# Patient Record
Sex: MALE | Race: WHITE | NOT HISPANIC OR LATINO
[De-identification: names, ages, dates, MRNs, and addresses within clinical notes are randomized per-mention and may not be internally consistent; named-entity substitution may affect disease eponyms.]

---

## 2024-10-28 ENCOUNTER — P2P PATIENT RECORD (OUTPATIENT)
Age: 79
End: 2024-10-28

## 2024-11-04 ENCOUNTER — OFFICE VISIT (OUTPATIENT)
Dept: URBAN - METROPOLITAN AREA CLINIC 2 | Facility: CLINIC | Age: 79
End: 2024-11-04
Payer: MEDICARE

## 2024-11-04 ENCOUNTER — DASHBOARD ENCOUNTERS (OUTPATIENT)
Age: 79
End: 2024-11-04

## 2024-11-04 VITALS
DIASTOLIC BLOOD PRESSURE: 77 MMHG | HEIGHT: 67 IN | WEIGHT: 197.4 LBS | SYSTOLIC BLOOD PRESSURE: 116 MMHG | TEMPERATURE: 98.2 F | HEART RATE: 65 BPM | BODY MASS INDEX: 30.98 KG/M2

## 2024-11-04 DIAGNOSIS — Z86.0101 HISTORY OF ADENOMATOUS POLYP OF COLON: ICD-10-CM

## 2024-11-04 DIAGNOSIS — K58.2 IRRITABLE BOWEL SYNDROME WITH BOTH CONSTIPATION AND DIARRHEA: ICD-10-CM

## 2024-11-04 PROBLEM — 10743008: Status: ACTIVE | Noted: 2024-11-04

## 2024-11-04 PROCEDURE — 99243 OFF/OP CNSLTJ NEW/EST LOW 30: CPT | Performed by: NURSE PRACTITIONER

## 2024-11-04 RX ORDER — CARVEDILOL 12.5 MG/1
TABLET, FILM COATED ORAL
Qty: 180 TABLET | Status: ACTIVE | COMMUNITY

## 2024-11-04 RX ORDER — BRIMONIDINE TARTRATE 2 MG/ML
SOLUTION/ DROPS OPHTHALMIC
Qty: 15 MILLILITER | Status: ACTIVE | COMMUNITY

## 2024-11-04 RX ORDER — UMECLIDINIUM BROMIDE AND VILANTEROL TRIFENATATE 62.5; 25 UG/1; UG/1
POWDER RESPIRATORY (INHALATION)
Qty: 60 EACH | Status: ACTIVE | COMMUNITY

## 2024-11-04 RX ORDER — DORZOLAMIDE HYDROCHLORIDE AND TIMOLOL MALEATE 20; 5 MG/ML; MG/ML
SOLUTION OPHTHALMIC
Qty: 30 MILLILITER | Status: ACTIVE | COMMUNITY

## 2024-11-04 RX ORDER — PANTOPRAZOLE 40 MG/1
TABLET, DELAYED RELEASE ORAL
Qty: 90 TABLET | Status: ACTIVE | COMMUNITY

## 2024-11-04 RX ORDER — LATANOPROSTENE BUNOD 0.24 MG/ML
INSTILL 1 DROP INTO BOTH EYES EVERY NIGHT AT BEDTIME SOLUTION/ DROPS OPHTHALMIC
Qty: 7.5 MILLILITER | Refills: 0 | Status: ACTIVE | COMMUNITY

## 2024-11-04 RX ORDER — TRAZODONE HYDROCHLORIDE 100 MG/1
TABLET ORAL
Qty: 90 TABLET | Status: ACTIVE | COMMUNITY

## 2024-11-04 NOTE — HPI-TODAY'S VISIT:
Very pleasant 79-year-old male seen on referral from Dr Emerson Wray today for complaints of upset stomach and black stools.  Patient will have 3 to 4 days of constipation and then will have loose stools.  On those days this bowel movements can be urgent.  He denies overt GI bleeding.  He has taken Pepto-Bismol to help with the frequency.  His weight is stable he is trying to lose weight.  He was recently started on pantoprazole.  His last colonoscopy was 2018 and was notable for an adenomatous colon polyp.  Patient takes Eliquis for history of pulmonary emboli. A copy of this will be sent to the referring provider.

## 2025-02-20 ENCOUNTER — LAB OUTSIDE AN ENCOUNTER (OUTPATIENT)
Dept: URBAN - METROPOLITAN AREA CLINIC 2 | Facility: CLINIC | Age: 80
End: 2025-02-20

## 2025-02-20 ENCOUNTER — OFFICE VISIT (OUTPATIENT)
Dept: URBAN - METROPOLITAN AREA CLINIC 2 | Facility: CLINIC | Age: 80
End: 2025-02-20
Payer: COMMERCIAL

## 2025-02-20 VITALS
BODY MASS INDEX: 30.64 KG/M2 | DIASTOLIC BLOOD PRESSURE: 83 MMHG | WEIGHT: 195.2 LBS | HEART RATE: 68 BPM | SYSTOLIC BLOOD PRESSURE: 129 MMHG | TEMPERATURE: 97.8 F | HEIGHT: 67 IN

## 2025-02-20 DIAGNOSIS — R19.7 DIARRHEA, UNSPECIFIED TYPE: ICD-10-CM

## 2025-02-20 DIAGNOSIS — Z86.0101 HISTORY OF ADENOMATOUS POLYP OF COLON: ICD-10-CM

## 2025-02-20 PROCEDURE — 99213 OFFICE O/P EST LOW 20 MIN: CPT | Performed by: NURSE PRACTITIONER

## 2025-02-20 RX ORDER — TRAZODONE HYDROCHLORIDE 100 MG/1
TABLET ORAL
Qty: 90 TABLET | Status: ACTIVE | COMMUNITY

## 2025-02-20 RX ORDER — DORZOLAMIDE HYDROCHLORIDE AND TIMOLOL MALEATE 20; 5 MG/ML; MG/ML
SOLUTION OPHTHALMIC
Qty: 30 MILLILITER | Status: ACTIVE | COMMUNITY

## 2025-02-20 RX ORDER — PANTOPRAZOLE 40 MG/1
TABLET, DELAYED RELEASE ORAL
Qty: 90 TABLET | Status: ACTIVE | COMMUNITY

## 2025-02-20 RX ORDER — LATANOPROSTENE BUNOD 0.24 MG/ML
INSTILL 1 DROP INTO BOTH EYES EVERY NIGHT AT BEDTIME SOLUTION/ DROPS OPHTHALMIC
Qty: 7.5 MILLILITER | Refills: 0 | Status: ACTIVE | COMMUNITY

## 2025-02-20 RX ORDER — CARVEDILOL 12.5 MG/1
TABLET, FILM COATED ORAL
Qty: 180 TABLET | Status: ACTIVE | COMMUNITY

## 2025-02-20 RX ORDER — BRIMONIDINE TARTRATE 2 MG/ML
SOLUTION/ DROPS OPHTHALMIC
Qty: 15 MILLILITER | Status: ACTIVE | COMMUNITY

## 2025-02-20 RX ORDER — UMECLIDINIUM BROMIDE AND VILANTEROL TRIFENATATE 62.5; 25 UG/1; UG/1
POWDER RESPIRATORY (INHALATION)
Qty: 60 EACH | Status: ACTIVE | COMMUNITY

## 2025-02-20 NOTE — HPI-TODAY'S VISIT:
Very pleasant 80-year-old male seen today for complaints of diarrhea.  He feels constipated in the afternoons but in the mornings can have urgent diarrhea.  He does drink 3 cups of coffee per day no cream.  His weight is stable and his appetite is good.  He had a normal ultrasound of his gallbladder but did have enlarged liver.  Patient is due for colon cancer screening due to history of colon polyps.  Patient also takes Eliquis for history of pulmonary emboli.  His primary care refills medication he does not follow with pulmonary.  Patient does have cardiac history and regularly follows with Dr. Cordero.

## 2025-02-26 ENCOUNTER — TELEPHONE ENCOUNTER (OUTPATIENT)
Dept: URBAN - METROPOLITAN AREA CLINIC 2 | Facility: CLINIC | Age: 80
End: 2025-02-26

## 2025-02-27 ENCOUNTER — TELEPHONE ENCOUNTER (OUTPATIENT)
Dept: URBAN - METROPOLITAN AREA CLINIC 80 | Facility: CLINIC | Age: 80
End: 2025-02-27

## 2025-03-06 ENCOUNTER — OFFICE VISIT (OUTPATIENT)
Dept: URBAN - METROPOLITAN AREA CLINIC 128 | Facility: CLINIC | Age: 80
End: 2025-03-06

## 2025-03-07 ENCOUNTER — OFFICE VISIT (OUTPATIENT)
Dept: URBAN - METROPOLITAN AREA CLINIC 128 | Facility: CLINIC | Age: 80
End: 2025-03-07

## 2025-03-13 ENCOUNTER — CLAIMS CREATED FROM THE CLAIM WINDOW (OUTPATIENT)
Dept: URBAN - METROPOLITAN AREA SURGERY CENTER 31 | Facility: SURGERY CENTER | Age: 80
End: 2025-03-13
Payer: COMMERCIAL

## 2025-03-13 DIAGNOSIS — R19.7 ACUTE DIARRHEA: ICD-10-CM

## 2025-03-13 DIAGNOSIS — Z86.0100 HISTORY OF COLON POLYPS: ICD-10-CM

## 2025-03-13 DIAGNOSIS — Z86.0100 PERSONAL HISTORY OF COLON POLYPS, UNSPECIFIED: ICD-10-CM

## 2025-03-13 DIAGNOSIS — Z12.11 ENCOUNTER FOR SCREENING FOR MALIGNANT NEOPLASM OF COLON: ICD-10-CM

## 2025-03-13 PROCEDURE — 45380 COLONOSCOPY AND BIOPSY: CPT | Performed by: INTERNAL MEDICINE

## 2025-03-13 PROCEDURE — 00812 ANES LWR INTST SCR COLSC: CPT | Performed by: NURSE ANESTHETIST, CERTIFIED REGISTERED

## 2025-03-13 RX ORDER — TRAZODONE HYDROCHLORIDE 100 MG/1
TABLET ORAL
Qty: 90 TABLET | Status: ACTIVE | COMMUNITY

## 2025-03-13 RX ORDER — BRIMONIDINE TARTRATE 2 MG/ML
SOLUTION/ DROPS OPHTHALMIC
Qty: 15 MILLILITER | Status: ACTIVE | COMMUNITY

## 2025-03-13 RX ORDER — CARVEDILOL 12.5 MG/1
TABLET, FILM COATED ORAL
Qty: 180 TABLET | Status: ACTIVE | COMMUNITY

## 2025-03-13 RX ORDER — DORZOLAMIDE HYDROCHLORIDE AND TIMOLOL MALEATE 20; 5 MG/ML; MG/ML
SOLUTION OPHTHALMIC
Qty: 30 MILLILITER | Status: ACTIVE | COMMUNITY

## 2025-03-13 RX ORDER — UMECLIDINIUM BROMIDE AND VILANTEROL TRIFENATATE 62.5; 25 UG/1; UG/1
POWDER RESPIRATORY (INHALATION)
Qty: 60 EACH | Status: ACTIVE | COMMUNITY

## 2025-03-13 RX ORDER — LATANOPROSTENE BUNOD 0.24 MG/ML
INSTILL 1 DROP INTO BOTH EYES EVERY NIGHT AT BEDTIME SOLUTION/ DROPS OPHTHALMIC
Qty: 7.5 MILLILITER | Refills: 0 | Status: ACTIVE | COMMUNITY

## 2025-03-13 RX ORDER — PANTOPRAZOLE 40 MG/1
TABLET, DELAYED RELEASE ORAL
Qty: 90 TABLET | Status: ACTIVE | COMMUNITY